# Patient Record
Sex: FEMALE | URBAN - METROPOLITAN AREA
[De-identification: names, ages, dates, MRNs, and addresses within clinical notes are randomized per-mention and may not be internally consistent; named-entity substitution may affect disease eponyms.]

---

## 2018-10-02 ENCOUNTER — APPOINTMENT (OUTPATIENT)
Dept: URBAN - METROPOLITAN AREA CLINIC 236 | Age: 23
Setting detail: DERMATOLOGY
End: 2018-10-02

## 2018-10-02 DIAGNOSIS — Z41.9 ENCOUNTER FOR PROCEDURE FOR PURPOSES OTHER THAN REMEDYING HEALTH STATE, UNSPECIFIED: ICD-10-CM

## 2018-10-02 PROCEDURE — OTHER LASER HAIR REMOVAL: OTHER

## 2018-10-02 NOTE — PROCEDURE: LASER HAIR REMOVAL
Treatment Number: 0
Render Post-Care In The Note: No
Detail Level: Detailed
Fluence (Will Not Render If 0): 20
Shaving (Optional): The patient shaved at home
Price (Use Numbers Only, No Special Characters Or $): 100.00
Laser Type: diode 810nm
Fluence (Will Not Render If 0): 22
Consent: Written consent obtained, risks reviewed including but not limited to crusting, scabbing, blistering, scarring, darker or lighter pigmentary change, paradoxical hair regrowth, incomplete removal of hair and infection.
Cooling: chill tip
Post-Care Instructions: I reviewed with the patient in detail post-care instructions. Patient should avoid sun for a minimum of 4 weeks before and after treatment.
Pulse Duration: 30 ms

## 2018-11-28 ENCOUNTER — APPOINTMENT (OUTPATIENT)
Dept: URBAN - METROPOLITAN AREA CLINIC 236 | Age: 23
Setting detail: DERMATOLOGY
End: 2018-11-28

## 2018-11-28 DIAGNOSIS — Z41.9 ENCOUNTER FOR PROCEDURE FOR PURPOSES OTHER THAN REMEDYING HEALTH STATE, UNSPECIFIED: ICD-10-CM

## 2018-11-28 PROCEDURE — OTHER LASER HAIR REMOVAL: OTHER

## 2018-11-28 NOTE — PROCEDURE: LASER HAIR REMOVAL
Treatment Number: 0
Laser Type: diode 810nm
Fluence (Will Not Render If 0): 20
Cooling: chill tip
Price (Use Numbers Only, No Special Characters Or $): 100.00
Render Post-Care In The Note: No
Pulse Duration: 30 ms
Detail Level: Detailed